# Patient Record
Sex: FEMALE | Race: BLACK OR AFRICAN AMERICAN | NOT HISPANIC OR LATINO | Employment: UNEMPLOYED | URBAN - METROPOLITAN AREA
[De-identification: names, ages, dates, MRNs, and addresses within clinical notes are randomized per-mention and may not be internally consistent; named-entity substitution may affect disease eponyms.]

---

## 2022-12-20 PROBLEM — F32.A DEPRESSION: Status: ACTIVE | Noted: 2022-12-20

## 2022-12-20 PROBLEM — F41.9 ANXIETY: Status: ACTIVE | Noted: 2022-12-20

## 2022-12-20 PROBLEM — G47.00 INSOMNIA: Status: ACTIVE | Noted: 2022-12-20

## 2023-01-04 PROBLEM — M54.2 NECK PAIN: Status: ACTIVE | Noted: 2023-01-04

## 2023-01-04 PROBLEM — M60.9 MYOSITIS OF LOWER EXTREMITY: Status: ACTIVE | Noted: 2023-01-04

## 2023-03-15 PROBLEM — F33.1 MODERATE EPISODE OF RECURRENT MAJOR DEPRESSIVE DISORDER: Status: ACTIVE | Noted: 2023-03-15

## 2023-03-16 ENCOUNTER — SOCIAL WORK (OUTPATIENT)
Dept: ADMINISTRATIVE | Facility: OTHER | Age: 43
End: 2023-03-16

## 2023-04-05 PROBLEM — M60.9 MYOSITIS: Status: ACTIVE | Noted: 2023-04-05

## 2023-04-05 PROBLEM — J84.9 ILD (INTERSTITIAL LUNG DISEASE): Status: ACTIVE | Noted: 2023-04-05

## 2023-06-23 ENCOUNTER — SOCIAL WORK (OUTPATIENT)
Dept: ADMINISTRATIVE | Facility: OTHER | Age: 43
End: 2023-06-23

## 2023-06-23 NOTE — PROGRESS NOTES
Sw received a consult to assist with counseling. Kevon called and spoke to Patient ([phone]). She explained she is uncertain about counseling. She doesn't mind talking to a counselor but doesn't think she needs to see one at this time. Kevon offered Patient the option of thinking about it. Sw will mail her the names of a few counselors. If she decides she does want to do counseling, Patient can call one of the counselors to schedule an appointment. Patient verbalized appreciation.    Eun Chakraborty LCSW    [phone]

## 2023-08-31 ENCOUNTER — SOCIAL WORK (OUTPATIENT)
Dept: ADMINISTRATIVE | Facility: OTHER | Age: 43
End: 2023-08-31

## 2023-08-31 NOTE — PROGRESS NOTES
Sw received a consult to assist with counseling. Sw called and spoke to Patient ([phone]). She is agreeable to counseling. Kevon faxed a referral to [facility] to review.    Eun Chakraborty LCSW    [phone]

## 2023-09-01 ENCOUNTER — SOCIAL WORK (OUTPATIENT)
Dept: ADMINISTRATIVE | Facility: OTHER | Age: 43
End: 2023-09-01

## 2023-09-01 NOTE — PROGRESS NOTES
Sw called [facility] to follow-up on the counseling referral. Staff did call Patient but have been unable to leave a message. If Patient is contacted and scheduled an appointment, Sw will be notified. Sw verbalized appreciation.    [facility]  [address]  [address]  [place], [place]  [phone]    Eun Chakraborty LCSW    [phone]

## 2023-11-03 PROBLEM — F41.9 ANXIETY: Status: RESOLVED | Noted: 2022-12-20 | Resolved: 2023-11-03

## 2023-11-03 PROBLEM — F32.A DEPRESSION: Status: RESOLVED | Noted: 2022-12-20 | Resolved: 2023-11-03

## 2024-03-21 PROBLEM — F41.1 GAD (GENERALIZED ANXIETY DISORDER): Status: ACTIVE | Noted: 2024-03-21

## 2024-03-22 ENCOUNTER — SOCIAL WORK (OUTPATIENT)
Dept: ADMINISTRATIVE | Facility: OTHER | Age: 44
End: 2024-03-22

## 2024-03-22 NOTE — PROGRESS NOTES
Sw received a return phone call from Patient. She confirmed she is in need of a new therapist. Sw faxed a referral to [facility] to review.    Eun Chakraborty LCSW    [phone]

## 2024-03-22 NOTE — PROGRESS NOTES
Sw received a consult to assist Patient in locating a new therapist. Sw called Patient ([phone]). A voice message was left requesting she call back.    Eun Chakraborty LCSW    [phone]

## 2024-03-25 ENCOUNTER — SOCIAL WORK (OUTPATIENT)
Dept: ADMINISTRATIVE | Facility: OTHER | Age: 44
End: 2024-03-25

## 2024-03-25 NOTE — PROGRESS NOTES
Kevon received a phone call from [facility]. Luda has contacted patient and scheduled her an assessment. Kevon verbalized appreciation.    [facility]  [address]  [place], [place]  [phone]  Appt:  4/1/2024 at 2:00 pm    Eun Chakraborty LCSW    [phone]

## 2024-11-27 PROBLEM — M33.20: Status: ACTIVE | Noted: 2024-11-27

## 2024-11-27 PROBLEM — D89.89: Status: ACTIVE | Noted: 2024-11-27

## 2025-06-27 ENCOUNTER — PATIENT OUTREACH (OUTPATIENT)
Dept: ADMINISTRATIVE | Facility: HOSPITAL | Age: 45
End: 2025-06-27

## 2025-06-27 NOTE — PROGRESS NOTES
6-27-25 please address [facility] cervical cancer screening, please offer self swab during visit, noted on 6-30-25 appt notes

## 2025-07-21 ENCOUNTER — PATIENT MESSAGE (OUTPATIENT)
Dept: ADMINISTRATIVE | Facility: HOSPITAL | Age: 45
End: 2025-07-21

## 2025-08-13 ENCOUNTER — PATIENT OUTREACH (OUTPATIENT)
Dept: ADMINISTRATIVE | Facility: HOSPITAL | Age: 45
End: 2025-08-13